# Patient Record
Sex: FEMALE | Race: WHITE | HISPANIC OR LATINO | Employment: FULL TIME | URBAN - METROPOLITAN AREA
[De-identification: names, ages, dates, MRNs, and addresses within clinical notes are randomized per-mention and may not be internally consistent; named-entity substitution may affect disease eponyms.]

---
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
## 2024-06-25 ENCOUNTER — OFFICE VISIT (OUTPATIENT)
Dept: MEDICAL GROUP | Facility: MEDICAL CENTER | Age: 24
End: 2024-06-25
Payer: COMMERCIAL

## 2024-06-25 VITALS
OXYGEN SATURATION: 96 % | BODY MASS INDEX: 22.62 KG/M2 | DIASTOLIC BLOOD PRESSURE: 60 MMHG | HEART RATE: 87 BPM | HEIGHT: 64 IN | TEMPERATURE: 98.3 F | SYSTOLIC BLOOD PRESSURE: 98 MMHG | WEIGHT: 132.5 LBS

## 2024-06-25 DIAGNOSIS — L70.0 ACNE VULGARIS: ICD-10-CM

## 2024-06-25 DIAGNOSIS — Z00.00 HEALTHCARE MAINTENANCE: ICD-10-CM

## 2024-06-25 DIAGNOSIS — Z11.3 SCREENING EXAMINATION FOR SEXUALLY TRANSMITTED DISEASE: ICD-10-CM

## 2024-06-25 DIAGNOSIS — Z23 NEED FOR VACCINATION: ICD-10-CM

## 2024-06-25 DIAGNOSIS — Z86.19 HISTORY OF CHLAMYDIA INFECTION: ICD-10-CM

## 2024-06-25 DIAGNOSIS — N83.202 CYST OF LEFT OVARY: Primary | ICD-10-CM

## 2024-06-25 PROCEDURE — 90471 IMMUNIZATION ADMIN: CPT | Performed by: STUDENT IN AN ORGANIZED HEALTH CARE EDUCATION/TRAINING PROGRAM

## 2024-06-25 PROCEDURE — 3074F SYST BP LT 130 MM HG: CPT | Performed by: STUDENT IN AN ORGANIZED HEALTH CARE EDUCATION/TRAINING PROGRAM

## 2024-06-25 PROCEDURE — 99204 OFFICE O/P NEW MOD 45 MIN: CPT | Mod: 25 | Performed by: STUDENT IN AN ORGANIZED HEALTH CARE EDUCATION/TRAINING PROGRAM

## 2024-06-25 PROCEDURE — 3078F DIAST BP <80 MM HG: CPT | Performed by: STUDENT IN AN ORGANIZED HEALTH CARE EDUCATION/TRAINING PROGRAM

## 2024-06-25 PROCEDURE — 90651 9VHPV VACCINE 2/3 DOSE IM: CPT | Performed by: STUDENT IN AN ORGANIZED HEALTH CARE EDUCATION/TRAINING PROGRAM

## 2024-06-25 RX ORDER — CLINDAMYCIN AND BENZOYL PEROXIDE 10; 50 MG/G; MG/G
1 GEL TOPICAL 2 TIMES DAILY
Qty: 50 G | Refills: 0 | Status: SHIPPED | OUTPATIENT
Start: 2024-06-25

## 2024-06-25 ASSESSMENT — ENCOUNTER SYMPTOMS
BLOOD IN STOOL: 0
SENSORY CHANGE: 0
ROS SKIN COMMENTS: ACNE OF FACE
DIZZINESS: 0
SEIZURES: 0
ABDOMINAL PAIN: 0
FEVER: 0
FOCAL WEAKNESS: 0
VOMITING: 0
FALLS: 0
SORE THROAT: 0
HALLUCINATIONS: 0
CHILLS: 0
PALPITATIONS: 0

## 2024-06-25 ASSESSMENT — PATIENT HEALTH QUESTIONNAIRE - PHQ9: CLINICAL INTERPRETATION OF PHQ2 SCORE: 0

## 2024-06-25 NOTE — PROGRESS NOTES
Subjective:     CC:  There were no encounter diagnoses.    Chief Complaint   Patient presents with    Establish Care       HISTORY OF THE PRESENT ILLNESS: Patient is a 24 y.o. female. This pleasant patient is here today to establish care   No prior pcp.   Verbal consent was acquired by the patient to use [employer] ambient listening note generation during this visit Yes   History of Present Illness  This is a young female here to establish care.    The patient, from [facility], has not previously had a primary care physician. In 2021, an incidental finding of a left ovarian cyst was identified during a visit to the emergency room. She has not undergone a repeat ultrasound since then. She has no history of Polycystic Ovary Syndrome (PCOS) or other ovarian issues. Her menstrual cycles are regular, occurring every 30 days, and she denies experiencing heavy bleeding. However, she does report postcoital spotting, a symptom that persisted for approximately 2 months before subsiding. She experiences intermittent cramping during her menstrual cycle. She has a history of Chlamydia, which was treated, and she has been in a monogamous relationship for approximately a year. She denies the presence of herpes or cold sores. She has not undergone a Pap smear. She has no history of thyroid disorders, hypertension, or heart disease. She is sexually active and uses Plan B for contraception once or twice a month.   She does not smoke. She drinks alcohol occasionally. She does take edibles occasionally.   Her father had diabetes. She denies any family history of prostate, breast, colon cancer, or pelvic cancers.    Results  Imaging  CT scan in 2021 showed a left ovarian cyst.     No problems updated.    Health Maintenance: Completed    ROS:   Review of Systems   Constitutional:  Negative for chills and fever.   HENT:  Negative for congestion and sore throat.    Cardiovascular:  Negative for chest pain and palpitations.   Gastrointestinal:  " Negative for abdominal pain, blood in stool, melena and vomiting.   Musculoskeletal:  Negative for falls.   Skin:  Negative for rash.        Acne of face   Neurological:  Negative for dizziness, sensory change, focal weakness and seizures.   Psychiatric/Behavioral:  Negative for hallucinations and suicidal ideas.          Objective:       Exam: BP 98/60 (BP Location: Left arm, Patient Position: Sitting, BP Cuff Size: Adult)   Pulse 87   Temp 36.8 °C (98.3 °F) (Temporal)   Ht 1.626 m (5' 4\")   Wt 60.1 kg (132 lb 8 oz)   SpO2 96%  Body mass index is 22.74 kg/m².    Physical Exam  Constitutional:       Appearance: Normal appearance.   HENT:      Head: Normocephalic.   Eyes:      General: No scleral icterus.  Cardiovascular:      Rate and Rhythm: Normal rate and regular rhythm.      Pulses: Normal pulses.      Heart sounds: Normal heart sounds.   Pulmonary:      Effort: Pulmonary effort is normal.      Breath sounds: Normal breath sounds.   Musculoskeletal:      Right lower leg: No edema.      Left lower leg: No edema.   Skin:     General: Skin is warm.      Comments: Acne lesions present   Neurological:      Mental Status: She is alert and oriented to person, place, and time.   Psychiatric:         Mood and Affect: Mood normal.         Behavior: Behavior normal.             Labs: reviewed     Assessment & Plan:   24 y.o. female with the following -       Cyst of left ovary  Chronic, unclear status  Found to have a left ovarian cyst in CT scan imaging  Follow-up with ultrasound was recommended which is still pending  Patient also reports intermittent episodes of postcoital spotting  Plan  Discussed getting an ultrasound imaging.  Patient verbalized understanding and agrees with the plan  - US-PELVIC COMPLETE (TRANSABDOMINAL/TRANSVAGINAL) (COMBO); Future    Screening examination for sexually transmitted disease  History of chlamydia infection  Patient with history of chlamydia infection which was treated in " past  Currently have a single partner for more than a year  Plan  - HIV AG/AB Combo Assay Screening; Future  - T.Pallidum AB COREY (Screening); Future  - Trichomonas Vaginalis by TMA  - Hepatitis C Virus Antibody; Future  - HEP B Surface Antibody; Future  - Chlamydia/GC, PCR (Urine); Future    Labs  - Comp Metabolic Panel; Future  - CBC WITHOUT DIFFERENTIAL; Future  - TSH WITH REFLEX TO FT4; Future    Need for vaccination  Patient is due for HPV vaccination.  Discussed risk and benefits.  Patient verbalized understanding and agrees to get the vaccination today  - 9VHPV Vaccine 2-3 Dose IM    Acne vulgaris  Neck, uncontrolled  Plan  - clindamycin-benzoyl peroxide (BENZACLIN) gel; Apply 1 Application topically 2 times a day.  Dispense: 50 g; Refill: 0          No follow-ups on file.    Please note that this dictation was created using voice recognition software. I have made every reasonable attempt to correct obvious errors, but I expect that there are errors of grammar and possibly content that I did not discover before finalizing the note.

## 2024-08-27 ENCOUNTER — APPOINTMENT (OUTPATIENT)
Dept: MEDICAL GROUP | Facility: MEDICAL CENTER | Age: 24
End: 2024-08-27
Payer: COMMERCIAL

## 2024-08-28 DIAGNOSIS — L70.0 ACNE VULGARIS: ICD-10-CM

## 2024-08-29 NOTE — TELEPHONE ENCOUNTER
Received request via: Patient    Was the patient seen in the last year in this department? Yes    Does the patient have an active prescription (recently filled or refills available) for medication(s) requested? No    Does the patient have long-term Plus and need 100-day supply? (This applies to ALL medications) Patient does not have SCP

## 2024-08-30 RX ORDER — CLINDAMYCIN AND BENZOYL PEROXIDE 10; 50 MG/G; MG/G
1 GEL TOPICAL 2 TIMES DAILY
Qty: 50 G | Refills: 0 | Status: SHIPPED | OUTPATIENT
Start: 2024-08-30

## 2024-09-24 ENCOUNTER — HOSPITAL ENCOUNTER (OUTPATIENT)
Facility: MEDICAL CENTER | Age: 24
End: 2024-09-24
Attending: STUDENT IN AN ORGANIZED HEALTH CARE EDUCATION/TRAINING PROGRAM
Payer: COMMERCIAL

## 2024-09-24 ENCOUNTER — APPOINTMENT (OUTPATIENT)
Dept: MEDICAL GROUP | Facility: MEDICAL CENTER | Age: 24
End: 2024-09-24
Payer: COMMERCIAL

## 2024-09-24 VITALS
WEIGHT: 132.4 LBS | OXYGEN SATURATION: 97 % | TEMPERATURE: 98.1 F | BODY MASS INDEX: 22.61 KG/M2 | HEIGHT: 64 IN | SYSTOLIC BLOOD PRESSURE: 98 MMHG | HEART RATE: 98 BPM | DIASTOLIC BLOOD PRESSURE: 50 MMHG

## 2024-09-24 DIAGNOSIS — Z12.4 SCREENING FOR CERVICAL CANCER: ICD-10-CM

## 2024-09-24 DIAGNOSIS — K60.2 ANAL FISSURE: ICD-10-CM

## 2024-09-24 DIAGNOSIS — Z23 NEED FOR VACCINATION: ICD-10-CM

## 2024-09-24 DIAGNOSIS — K59.00 CONSTIPATION, UNSPECIFIED CONSTIPATION TYPE: ICD-10-CM

## 2024-09-24 DIAGNOSIS — Z01.419 WELL WOMAN EXAM: Primary | ICD-10-CM

## 2024-09-24 DIAGNOSIS — Z01.419 WELL WOMAN EXAM: ICD-10-CM

## 2024-09-24 PROCEDURE — 88175 CYTOPATH C/V AUTO FLUID REDO: CPT

## 2024-09-24 PROCEDURE — 3078F DIAST BP <80 MM HG: CPT | Performed by: STUDENT IN AN ORGANIZED HEALTH CARE EDUCATION/TRAINING PROGRAM

## 2024-09-24 PROCEDURE — 3074F SYST BP LT 130 MM HG: CPT | Performed by: STUDENT IN AN ORGANIZED HEALTH CARE EDUCATION/TRAINING PROGRAM

## 2024-09-24 PROCEDURE — 99213 OFFICE O/P EST LOW 20 MIN: CPT | Mod: 25 | Performed by: STUDENT IN AN ORGANIZED HEALTH CARE EDUCATION/TRAINING PROGRAM

## 2024-09-24 PROCEDURE — 99395 PREV VISIT EST AGE 18-39: CPT | Performed by: STUDENT IN AN ORGANIZED HEALTH CARE EDUCATION/TRAINING PROGRAM

## 2024-09-24 RX ORDER — POLYETHYLENE GLYCOL 3350 17 G/17G
17 POWDER, FOR SOLUTION ORAL DAILY
Qty: 238 G | Refills: 3 | Status: SHIPPED | OUTPATIENT
Start: 2024-09-24

## 2024-09-24 RX ORDER — HYDROCORTISONE 2.5 %
1 CREAM (GRAM) TOPICAL 2 TIMES DAILY
Qty: 1 EACH | Refills: 1 | Status: SHIPPED | OUTPATIENT
Start: 2024-09-24

## 2024-09-24 SDOH — ECONOMIC STABILITY: INCOME INSECURITY: IN THE LAST 12 MONTHS, WAS THERE A TIME WHEN YOU WERE NOT ABLE TO PAY THE MORTGAGE OR RENT ON TIME?: NO

## 2024-09-24 SDOH — HEALTH STABILITY: PHYSICAL HEALTH: ON AVERAGE, HOW MANY DAYS PER WEEK DO YOU ENGAGE IN MODERATE TO STRENUOUS EXERCISE (LIKE A BRISK WALK)?: 3 DAYS

## 2024-09-24 SDOH — HEALTH STABILITY: PHYSICAL HEALTH: ON AVERAGE, HOW MANY MINUTES DO YOU ENGAGE IN EXERCISE AT THIS LEVEL?: 30 MIN

## 2024-09-24 SDOH — ECONOMIC STABILITY: FOOD INSECURITY: WITHIN THE PAST 12 MONTHS, THE FOOD YOU BOUGHT JUST DIDN'T LAST AND YOU DIDN'T HAVE MONEY TO GET MORE.: NEVER TRUE

## 2024-09-24 SDOH — ECONOMIC STABILITY: INCOME INSECURITY: HOW HARD IS IT FOR YOU TO PAY FOR THE VERY BASICS LIKE FOOD, HOUSING, MEDICAL CARE, AND HEATING?: NOT VERY HARD

## 2024-09-24 SDOH — ECONOMIC STABILITY: TRANSPORTATION INSECURITY
IN THE PAST 12 MONTHS, HAS LACK OF RELIABLE TRANSPORTATION KEPT YOU FROM MEDICAL APPOINTMENTS, MEETINGS, WORK OR FROM GETTING THINGS NEEDED FOR DAILY LIVING?: NO

## 2024-09-24 SDOH — ECONOMIC STABILITY: TRANSPORTATION INSECURITY
IN THE PAST 12 MONTHS, HAS THE LACK OF TRANSPORTATION KEPT YOU FROM MEDICAL APPOINTMENTS OR FROM GETTING MEDICATIONS?: NO

## 2024-09-24 SDOH — ECONOMIC STABILITY: FOOD INSECURITY: WITHIN THE PAST 12 MONTHS, YOU WORRIED THAT YOUR FOOD WOULD RUN OUT BEFORE YOU GOT MONEY TO BUY MORE.: NEVER TRUE

## 2024-09-24 SDOH — ECONOMIC STABILITY: TRANSPORTATION INSECURITY
IN THE PAST 12 MONTHS, HAS LACK OF TRANSPORTATION KEPT YOU FROM MEETINGS, WORK, OR FROM GETTING THINGS NEEDED FOR DAILY LIVING?: NO

## 2024-09-24 SDOH — HEALTH STABILITY: MENTAL HEALTH
STRESS IS WHEN SOMEONE FEELS TENSE, NERVOUS, ANXIOUS, OR CAN'T SLEEP AT NIGHT BECAUSE THEIR MIND IS TROUBLED. HOW STRESSED ARE YOU?: TO SOME EXTENT

## 2024-09-24 SDOH — ECONOMIC STABILITY: HOUSING INSECURITY
IN THE LAST 12 MONTHS, WAS THERE A TIME WHEN YOU DID NOT HAVE A STEADY PLACE TO SLEEP OR SLEPT IN A SHELTER (INCLUDING NOW)?: NO

## 2024-09-24 ASSESSMENT — SOCIAL DETERMINANTS OF HEALTH (SDOH)
DO YOU BELONG TO ANY CLUBS OR ORGANIZATIONS SUCH AS CHURCH GROUPS UNIONS, FRATERNAL OR ATHLETIC GROUPS, OR SCHOOL GROUPS?: NO
HOW OFTEN DO YOU HAVE A DRINK CONTAINING ALCOHOL: MONTHLY OR LESS
HOW OFTEN DO YOU ATTENT MEETINGS OF THE CLUB OR ORGANIZATION YOU BELONG TO?: NEVER
HOW OFTEN DO YOU ATTENT MEETINGS OF THE CLUB OR ORGANIZATION YOU BELONG TO?: NEVER
DO YOU BELONG TO ANY CLUBS OR ORGANIZATIONS SUCH AS CHURCH GROUPS UNIONS, FRATERNAL OR ATHLETIC GROUPS, OR SCHOOL GROUPS?: NO
HOW OFTEN DO YOU ATTEND CHURCH OR RELIGIOUS SERVICES?: 1 TO 4 TIMES PER YEAR
HOW OFTEN DO YOU GET TOGETHER WITH FRIENDS OR RELATIVES?: ONCE A WEEK
IN A TYPICAL WEEK, HOW MANY TIMES DO YOU TALK ON THE PHONE WITH FAMILY, FRIENDS, OR NEIGHBORS?: MORE THAN THREE TIMES A WEEK
IN THE PAST 12 MONTHS, HAS THE ELECTRIC, GAS, OIL, OR WATER COMPANY THREATENED TO SHUT OFF SERVICE IN YOUR HOME?: NO
IN A TYPICAL WEEK, HOW MANY TIMES DO YOU TALK ON THE PHONE WITH FAMILY, FRIENDS, OR NEIGHBORS?: MORE THAN THREE TIMES A WEEK
WITHIN THE PAST 12 MONTHS, YOU WORRIED THAT YOUR FOOD WOULD RUN OUT BEFORE YOU GOT THE MONEY TO BUY MORE: NEVER TRUE
HOW OFTEN DO YOU ATTEND CHURCH OR RELIGIOUS SERVICES?: 1 TO 4 TIMES PER YEAR
HOW HARD IS IT FOR YOU TO PAY FOR THE VERY BASICS LIKE FOOD, HOUSING, MEDICAL CARE, AND HEATING?: NOT VERY HARD
HOW OFTEN DO YOU HAVE SIX OR MORE DRINKS ON ONE OCCASION: MONTHLY
HOW OFTEN DO YOU GET TOGETHER WITH FRIENDS OR RELATIVES?: ONCE A WEEK
HOW MANY DRINKS CONTAINING ALCOHOL DO YOU HAVE ON A TYPICAL DAY WHEN YOU ARE DRINKING: 1 OR 2
ARE YOU MARRIED, WIDOWED, DIVORCED, SEPARATED, NEVER MARRIED, OR LIVING WITH A PARTNER?: NEVER MARRIED
ARE YOU MARRIED, WIDOWED, DIVORCED, SEPARATED, NEVER MARRIED, OR LIVING WITH A PARTNER?: NEVER MARRIED

## 2024-09-24 ASSESSMENT — LIFESTYLE VARIABLES
HOW OFTEN DO YOU HAVE A DRINK CONTAINING ALCOHOL: MONTHLY OR LESS
HOW OFTEN DO YOU HAVE SIX OR MORE DRINKS ON ONE OCCASION: MONTHLY
SKIP TO QUESTIONS 9-10: 0
HOW MANY STANDARD DRINKS CONTAINING ALCOHOL DO YOU HAVE ON A TYPICAL DAY: 1 OR 2
AUDIT-C TOTAL SCORE: 3

## 2024-09-24 NOTE — PROGRESS NOTES
Subjective:     CC:   Chief Complaint   Patient presents with   • Annual Exam   • Constipation       HPI:   Ernesto Baldwin is a 24 y.o. female who presents for annual exam  Verbal consent was acquired by the patient to use [employer] ambient listening note generation during this visit {YES/NO:200010}  History of Present Illness  The patient is a 24-year-old female who presents for an annual wellness visit.    She reports difficulty in passing stool, with her last bowel movement occurring approximately 2 weeks ago. Despite daily attempts, she experiences pain during the process. She has noticed blood when wiping but does not experience any itching. She also experiences pain in the anal region. Her water intake is adequate, and she has recently increased her fiber intake. She has been using stool softeners to alleviate the issue. She has not used herbal or over-the-counter products prior to the onset of constipation and has not tried MiraLAX. She has experienced constipation in the past, but it typically lasted for a day or so.    She has no history of sexually transmitted diseases and does not frequently suffer from infections. Her menstrual cycles are regular. She does not use birth control pills but occasionally uses condoms. She reports no vaginal itching, white discharge, or other discomfort. She also reports no breast pain or tenderness.    IMMUNIZATIONS  She received influenza vaccine about a month ago.    Results       Pt has GYN provider: {YES/NO:63}  Last pap:  ***Last mammo:  ***Last colonoscopy: ***  ***Bone density test:{YES/NO:63}  ***Gardiasil:{YES/NO:63}   Last Tdap: ***      Menses every month with *** days {Description; bleeding vaginal:90300} bleeding.  Cramping is {MILD/MOD/SEVERE/VARIABLE:52819}.   She {DOES/DOES NOT:49725} take OTC analgesics for cramps.  No significant bloating/fluid retention, pelvic pain, or dyspareunia. No vaginal discharge ***  No breast tenderness, mass, nipple  discharge, changes in size or contour, or abnormal cyclic discomfort.  She {DOES/DOES NOT:38456} perform regular self breast exams.  Regular exercise: {YES/NO/NOT ASKED:6423}   Diet: ***    She  has no past medical history on file.  She  has no past surgical history on file.    Family History   Problem Relation Age of Onset   • Diabetes Father    • Breast Cancer Neg Hx    • Bilateral Breast Cancer Neg Hx    • Colorectal Cancer Neg Hx    • Peritoneal Cancer Neg Hx    • Tubal Cancer Neg Hx    • Ovarian Cancer Neg Hx    • Cancer Neg Hx        Social History     Socioeconomic History   • Marital status: Single     Spouse name: Not on file   • Number of children: Not on file   • Years of education: Not on file   • Highest education level: 11th grade   Occupational History   • Not on file   Tobacco Use   • Smoking status: Never   • Smokeless tobacco: Never   Vaping Use   • Vaping status: Some Days   • Substances: Nicotine   Substance and Sexual Activity   • Alcohol use: Yes     Comment: occ   • Drug use: Yes     Types: Marijuana, Oral     Comment: occasional , 1-2 times in 4-6 months   • Sexual activity: Yes     Partners: Male     Birth control/protection: Pill   Other Topics Concern   • Not on file   Social History Narrative   • Not on file     Social Determinants of Health     Financial Resource Strain: Low Risk  (9/24/2024)    Overall Financial Resource Strain (CARDIA)    • Difficulty of Paying Living Expenses: Not very hard   Food Insecurity: No Food Insecurity (9/24/2024)    Hunger Vital Sign    • Worried About Running Out of Food in the Last Year: Never true    • Ran Out of Food in the Last Year: Never true   Transportation Needs: No Transportation Needs (9/24/2024)    PRAPARE - Transportation    • Lack of Transportation (Medical): No    • Lack of Transportation (Non-Medical): No   Physical Activity: Insufficiently Active (9/24/2024)    Exercise Vital Sign    • Days of Exercise per Week: 3 days    • Minutes of  "Exercise per Session: 30 min   Stress: Stress Concern Present (9/24/2024)    [employer] [employer] - Occupational Stress Questionnaire    • Feeling of Stress : To some extent   Social Connections: Moderately Isolated (9/24/2024)    Social Connection and Isolation Panel [NHANES]    • Frequency of Communication with Friends and Family: More than three times a week    • Frequency of Social Gatherings with Friends and Family: Once a week    • Attends [employer] Services: 1 to 4 times per year    • Active Member of Clubs or Organizations: No    • Attends Club or Organization Meetings: Never    • Marital Status: Never    Intimate Partner Violence: Not on file   Housing Stability: Low Risk  (9/24/2024)    Housing Stability Vital Sign    • Unable to Pay for Housing in the Last Year: No    • Number of Times Moved in the Last Year: 0    • Homeless in the Last Year: No       Patient Active Problem List    Diagnosis Date Noted   • History of chlamydia infection 06/25/2024   • Cyst of left ovary 06/25/2024         Current Outpatient Medications   Medication Sig Dispense Refill   • clindamycin-benzoyl peroxide (BENZACLIN) gel APPLY TO AFFECTED AREA TWICE A DAY 50 g 0     No current facility-administered medications for this visit.     No Known Allergies    Review of Systems    ROS    Objective:     BP 98/50 (BP Location: Left arm, Patient Position: Sitting, BP Cuff Size: Adult)   Pulse 98   Temp 36.7 °C (98.1 °F) (Temporal)   Ht 1.626 m (5' 4\")   Wt 60.1 kg (132 lb 6.4 oz)   LMP 09/12/2024   SpO2 97%   BMI 22.73 kg/m²   Body mass index is 22.73 kg/m².  Wt Readings from Last 4 Encounters:   09/24/24 60.1 kg (132 lb 6.4 oz)   06/25/24 60.1 kg (132 lb 8 oz)   11/15/21 51.4 kg (113 lb 5.1 oz) (21%, Z= -0.81)*     * Growth percentiles are based on CDC (Girls, 2-20 Years) data.       Physical Exam:    Physical Exam     Assessment and Plan:     No diagnosis found.    HCM:  ***     regular exercise "   healthy diet  Sun protection w SPF  Safety belts  Biannual dentis visit   Substance Abuse: Declined  Safe in relationship. Yes  Seat belts, bike helmet, gun safety discussed.  Colon cancer screening: up to date  Mammogram: up to date  Last lab results were reviewed by me today   Labs per orders    Follow-up: No follow-ups on file.

## 2024-09-25 NOTE — PROGRESS NOTES
SUBJECTIVE: 24 y.o. female for annual routine gynecologic exam  Chief Complaint   Patient presents with    Annual Exam    Constipation     Verbal consent was acquired by the patient to use [employer] ambient listening note generation during this visit Yes   History of Present Illness  The patient is a 24-year-old female who presents for an annual wellness visit.    She reports difficulty in passing stool, with her last bowel movement occurring approximately 2 weeks ago. Despite daily attempts, she experiences pain during the process. She has noticed blood when wiping but does not experience any itching. She also experiences pain in the anal region. Her water intake is adequate, and she has recently increased her fiber intake. She has been using stool softeners to alleviate the issue. She has not used herbal or over-the-counter products prior to the onset of constipation and has not tried MiraLAX. She has experienced constipation in the past, but it typically lasted for a day or so.    She has no history of sexually transmitted diseases and does not frequently suffer from infections. Her menstrual cycles are regular. She does not use birth control pills but occasionally uses condoms. She reports no vaginal itching, white discharge, or other discomfort. She also reports no breast pain or tenderness.    IMMUNIZATIONS  She received influenza vaccine about a month ago.    Results       OB History   No obstetric history on file.      Last Pap: never  Social History     Substance and Sexual Activity   Sexual Activity Yes    Partners: Male    Birth control/protection: Pill     H/O Abnormal Pap no  She  reports that she has never smoked. She has never used smokeless tobacco.        Allergies: Patient has no known allergies.     ROS:    Menses every month with regular , 3-5 days moderate bleeding.  Cramping is mild.   She does not take OTC analgesics for cramping  Reports mild, no menopause symptoms of hot flashes, night  sweats, sleep disruption, mood changes, vaginal dryness.   No significant bloating/fluid retention, pelvic pain, or dyspareunia. No vaginal discharge   No breast tenderness, mass, nipple discharge, changes in size or contour, or abnormal cyclic discomfort.  No urinary tract symptoms, no incontinence.   No abdominal pain,  black stools.    Positive for constipation for 2 weeks with hard stools. Streaks of blood on toilet tissue.  No unusual headaches, no visual changes, menstrual migraines   No prolonged cough. No dyspnea or chest pain on exertion.  No depression, labile mood, anxiety ,libido changes, insomnia.  No new/concerning skin lesions, concerns.     Exercise: moderate regular exercise program  Preventive Care:  Health Maintenance Topics with due status: Overdue       Topic Date Due    Cervical Cancer Screening Never done    HIV Screening Never done    CHLAMYDIA AND GONORRHEA SCREENING Never done    Hepatitis C Screening Never done    Men B Protection - Shared Decision Making Never done    Chickenpox Vaccine (Varicella) Never done    Hepatitis B Vaccine (Hep B) Never done    IMM DTaP/Tdap/Td Vaccine Never done    HPV Vaccines 07/23/2024    Influenza Vaccine Never done    COVID-19 Vaccine 09/01/2024       Current medicines (including changes today)  Current Outpatient Medications   Medication Sig Dispense Refill    polyethylene glycol 3350 (MIRALAX) 17 GM/SCOOP Powder Take 17 g by mouth every day. 238 g 3    hydrocortisone 2.5 % Cream topical cream Apply 1 Application topically 2 times a day. 1 Each 1    clindamycin-benzoyl peroxide (BENZACLIN) gel APPLY TO AFFECTED AREA TWICE A DAY 50 g 0     No current facility-administered medications for this visit.     She  has no past medical history on file.  She  has no past surgical history on file.     Family History:   Family History   Problem Relation Age of Onset    Diabetes Father     Breast Cancer Neg Hx     Bilateral Breast Cancer Neg Hx     Colorectal Cancer  "Neg Hx     Peritoneal Cancer Neg Hx     Tubal Cancer Neg Hx     Ovarian Cancer Neg Hx     Cancer Neg Hx        Family History negative for : Breast, Colon, Lung, or female organ cancer, thyroid disease, CAD, Diabetes, osteoporosis.     OBJECTIVE:   BP 98/50 (BP Location: Left arm, Patient Position: Sitting, BP Cuff Size: Adult)   Pulse 98   Temp 36.7 °C (98.1 °F) (Temporal)   Ht 1.626 m (5' 4\")   Wt 60.1 kg (132 lb 6.4 oz)   LMP 09/12/2024   SpO2 97%   BMI 22.73 kg/m²   Body mass index is 22.73 kg/m².    HEAD AND NECK:  Ears normal.  Throat, oral cavity and tongue normal.  Neck supple. No adenopathy or masses in the neck or supraclavicular regions.  No carotid bruits. No thyromegaly. NEURO: Cranial nerves are normal. DTR's normal and symmetric.  CHEST:  Clear, good air entry, no wheezes or rales. HEART:  Regular rate and rhythm.  S1 and S2 normal.   No edema or JVD. ABDOMEN:  Soft without tenderness, guarding, mass or organomegaly.  No CVA tenderness or inguinal adenopathy. EXTREMITIES:  Extremities, reflexes and peripheral pulses are normal. SKIN: color normal, vascularity normal, no edema, temperature normal   No rashes or suspicious skin lesions noted.      <ASSESSMENT and PLAN>  1. Well woman exam  THINPREP PAP WITH HPV      2. Constipation, unspecified constipation type  polyethylene glycol 3350 (MIRALAX) 17 GM/SCOOP Powder      3. Anal fissure  hydrocortisone 2.5 % Cream topical cream      4. Need for vaccination  CANCELED: Hepatitis B Vaccine Adult IM    CANCELED: TDAP VACCINE =>8YO IM      5. Screening for cervical cancer  THINPREP PAP WITH HPV        1. Well woman exam  Pelvic Exam -  Normal external genitalia with no lesions. Normal vaginal mucosa with normal rugation and no discharge. Cervix with no visible lesions. No cervical motion tenderness. Uterus is normal sized with no masses. No adnexal tenderness or enlargement appreciated. Sure Path Pap is obtained, vaginal swab is obtained and " specimen(s) sent to lab  Rectal: deferred. Has mild fissure close to anal region  - THINPREP PAP WITH HPV; Future    2. Constipation, unspecified constipation type  Acute problem  Plan:  - Discussed adequate hydration  - Fiber supplements: fruits/vegetable, psyllium (Metamucil, Konsyl) or calcium polycarbophil (FiberCon) or methylcellulose fiber (Citrucel).  - discussed utilization of stimulant/osmotic/lubrication laxatives and stool softener PRN for symptoms.   - Exercise regularly, at least 4-5 days per week      - polyethylene glycol 3350 (MIRALAX) 17 GM/SCOOP Powder; Take 17 g by mouth every day.  Dispense: 238 g; Refill: 3    3. Anal fissure  Acute problem   Likely due to constipation  Plan:  - hydrocortisone 2.5 % Cream topical cream; Apply 1 Application topically 2 times a day.  Dispense: 1 Each; Refill: 1    4.  Screening for cervical cancer  - THINPREP PAP WITH HPV; Future    Discussed  breast self exam, STD prevention, HIV risk factors and prevention, feminine hygiene, use and side effects of OCP's, diet and exercise   Follow-up in 1 years for next Gyn exam and 5 years for next Pap.   Next office visit for recheck of chronic medical conditions is due in 6 months

## 2024-10-03 LAB
CYTOLOGIST CVX/VAG CYTO: ABNORMAL
CYTOLOGY CVX/VAG DOC CYTO: ABNORMAL
CYTOLOGY CVX/VAG DOC THIN PREP: ABNORMAL
HPV I/H RISK 4 DNA CVX QL PROBE+SIG AMP: POSITIVE
HPV16 DNA CVX QL PROBE+SIG AMP: NEGATIVE
HPV18+45 E6+E7 MRNA CVX QL NAA+PROBE: NEGATIVE
NOTE NL11727A: ABNORMAL
OTHER STN SPEC: ABNORMAL
QC REVIEWED BY NL11722A: ABNORMAL
STAT OF ADQ CVX/VAG CYTO-IMP: ABNORMAL

## 2024-10-09 DIAGNOSIS — L70.0 ACNE VULGARIS: ICD-10-CM

## 2024-10-09 RX ORDER — CLINDAMYCIN AND BENZOYL PEROXIDE 10; 50 MG/G; MG/G
1 GEL TOPICAL 2 TIMES DAILY
Qty: 50 G | Refills: 0 | Status: SHIPPED | OUTPATIENT
Start: 2024-10-09

## 2024-10-10 ENCOUNTER — PATIENT MESSAGE (OUTPATIENT)
Dept: MEDICAL GROUP | Facility: MEDICAL CENTER | Age: 24
End: 2024-10-10
Payer: COMMERCIAL

## 2025-07-02 ENCOUNTER — OFFICE VISIT (OUTPATIENT)
Dept: URGENT CARE | Facility: CLINIC | Age: 25
End: 2025-07-02
Payer: COMMERCIAL

## 2025-07-02 VITALS
HEIGHT: 63 IN | DIASTOLIC BLOOD PRESSURE: 62 MMHG | OXYGEN SATURATION: 95 % | BODY MASS INDEX: 23.39 KG/M2 | WEIGHT: 132 LBS | SYSTOLIC BLOOD PRESSURE: 108 MMHG | TEMPERATURE: 97.4 F | HEART RATE: 87 BPM | RESPIRATION RATE: 16 BRPM

## 2025-07-02 DIAGNOSIS — J02.9 VIRAL PHARYNGITIS: ICD-10-CM

## 2025-07-02 DIAGNOSIS — J02.9 SORE THROAT: Primary | ICD-10-CM

## 2025-07-02 DIAGNOSIS — J06.9 VIRAL UPPER RESPIRATORY TRACT INFECTION WITH COUGH: ICD-10-CM

## 2025-07-02 DIAGNOSIS — R05.1 ACUTE COUGH: ICD-10-CM

## 2025-07-02 LAB
FLUAV RNA SPEC QL NAA+PROBE: NEGATIVE
FLUBV RNA SPEC QL NAA+PROBE: NEGATIVE
RSV RNA SPEC QL NAA+PROBE: NEGATIVE
S PYO DNA SPEC NAA+PROBE: NOT DETECTED
SARS-COV-2 RNA RESP QL NAA+PROBE: NEGATIVE

## 2025-07-02 PROCEDURE — 3074F SYST BP LT 130 MM HG: CPT

## 2025-07-02 PROCEDURE — 99214 OFFICE O/P EST MOD 30 MIN: CPT

## 2025-07-02 PROCEDURE — 87651 STREP A DNA AMP PROBE: CPT

## 2025-07-02 PROCEDURE — 3078F DIAST BP <80 MM HG: CPT

## 2025-07-02 PROCEDURE — 87637 SARSCOV2&INF A&B&RSV AMP PRB: CPT

## 2025-07-02 ASSESSMENT — ENCOUNTER SYMPTOMS
VOMITING: 0
EYE DISCHARGE: 0
SORE THROAT: 1
EYE REDNESS: 0
FEVER: 0
WHEEZING: 0
DIARRHEA: 0
BLOOD IN STOOL: 0
COUGH: 0
CONSTIPATION: 0
BRUISES/BLEEDS EASILY: 0

## 2025-07-02 NOTE — PROGRESS NOTES
"Subjective:     Ernesto Baldwin is a 25 y.o. female who presents for Pharyngitis (Patient is 23 weeks pregnant with a sore throat and cough and both my ears hurt.)      Pharyngitis   This is a new problem. Pertinent negatives include no congestion, coughing, diarrhea, ear discharge or vomiting.       Review of Systems   Constitutional:  Negative for fever.   HENT:  Positive for sore throat. Negative for congestion and ear discharge.    Eyes:  Negative for discharge and redness.   Respiratory:  Negative for cough and wheezing.    Gastrointestinal:  Negative for blood in stool, constipation, diarrhea and vomiting.   Genitourinary:  Negative for frequency and hematuria.   Skin:  Negative for itching and rash.   Endo/Heme/Allergies:  Does not bruise/bleed easily.        CURRENT MEDICATIONS:  clindamycin-benzoyl peroxide  hydrocortisone Crea  polyethylene glycol 3350 Powd    Allergies:   Allergies[1]    Current Problems: Ernesto Baldwin does not have any pertinent problems on file.  Past Surgical Hx:  No past surgical history on file.   Past Social Hx:  reports that she has never smoked. She has never used smokeless tobacco. She reports current alcohol use. She reports current drug use. Drugs: Marijuana and Oral.   Past Family Hx:  Ernesto Baldwin family history includes Diabetes in her father.     (Allergies, Medications, & Tobacco/Substance Use were reconciled by the Medical Assistant and reviewed by myself. The family history is prepopulated)       Objective:     /62   Pulse 87   Temp 36.3 °C (97.4 °F) (Temporal)   Resp 16   Ht 1.6 m (5' 3\")   Wt 59.9 kg (132 lb)   SpO2 95%   BMI 23.38 kg/m²     Physical Exam  Constitutional:       General: She is not in acute distress.     Appearance: Normal appearance. She is not ill-appearing, toxic-appearing or diaphoretic.   HENT:      Head: Normocephalic and atraumatic.      Nose: Congestion and rhinorrhea present.      Mouth/Throat:      Pharynx: " Posterior oropharyngeal erythema present. No oropharyngeal exudate.   Eyes:      General: No scleral icterus.        Right eye: No discharge.         Left eye: No discharge.   Cardiovascular:      Rate and Rhythm: Normal rate and regular rhythm.      Heart sounds: Normal heart sounds. No murmur heard.     No friction rub. No gallop.   Pulmonary:      Effort: Pulmonary effort is normal. No respiratory distress.      Breath sounds: No stridor. No wheezing, rhonchi or rales.   Chest:      Chest wall: No tenderness.   Abdominal:      General: Abdomen is flat.      Palpations: Abdomen is soft.   Musculoskeletal:         General: Normal range of motion.      Cervical back: No rigidity.   Skin:     General: Skin is warm and dry.   Neurological:      General: No focal deficit present.      Mental Status: She is alert and oriented to person, place, and time. Mental status is at baseline.   Psychiatric:         Behavior: Behavior normal.         Judgment: Judgment normal.         Assessment/Plan:     Ernesto was seen today for pharyngitis.    Diagnoses and all orders for this visit:    Sore throat  -     POCT CoV-2, Flu A/B, RSV by PCR  -     POCT GROUP A STREP, PCR    Viral pharyngitis    Acute cough    Viral upper respiratory tract infection with cough     Based on history of presenting illness, review of systems and physical exam findings, most likely etiology of sore throat is viral pharyngitis.  Discussed symptomatic management with pharmacotherapy and over-the-counter medications.  On my exam I do not see any signs or symptoms consistent with a bacterial infection currently requiring oral antibiotics.  Discussed the risks the benefits and the indications of all new medications prescribed today.  Strict return and ED precautions were discussed and all questions were answered.     Differential diagnosis, natural history, supportive care, and indications for immediate follow-up discussed.    Advised the patient to follow-up  with the primary care physician for recheck, reevaluation, and consideration of further management.    Please note that this dictation was created using voice recognition software. I have made reasonable attempt to correct obvious errors, but I expect that there are errors of grammar and possibly content that I did not discover before finalizing the note.    This note was electronically signed by Sherly Sharif MD PhD         [1] No Known Allergies

## 2025-07-02 NOTE — LETTER
July 2, 2025    To Whom It May Concern:         This is confirmation that Ernesto Kole Baldwin attended her scheduled appointment with Sherly Sharif MD, PhD on 7/02/25. She is to be excused from work on the following dates 7/2 and 7/3.          If you have any questions please do not hesitate to call me at the phone number listed below.    Sincerely,          Sherly Sharif MD, PhD  [phone]